# Patient Record
Sex: FEMALE | NOT HISPANIC OR LATINO | URBAN - METROPOLITAN AREA
[De-identification: names, ages, dates, MRNs, and addresses within clinical notes are randomized per-mention and may not be internally consistent; named-entity substitution may affect disease eponyms.]

---

## 2017-10-01 ENCOUNTER — EMERGENCY (EMERGENCY)
Facility: HOSPITAL | Age: 67
LOS: 1 days | Discharge: LEFT BEFORE DISCHARGE | End: 2017-10-01
Attending: EMERGENCY MEDICINE | Admitting: EMERGENCY MEDICINE
Payer: SELF-PAY

## 2017-10-01 VITALS
DIASTOLIC BLOOD PRESSURE: 71 MMHG | RESPIRATION RATE: 18 BRPM | SYSTOLIC BLOOD PRESSURE: 114 MMHG | OXYGEN SATURATION: 100 % | TEMPERATURE: 97 F | HEART RATE: 84 BPM

## 2017-10-01 DIAGNOSIS — R55 SYNCOPE AND COLLAPSE: ICD-10-CM

## 2017-10-01 LAB
ALBUMIN SERPL ELPH-MCNC: 4.7 G/DL — SIGNIFICANT CHANGE UP (ref 3.3–5)
ALP SERPL-CCNC: 81 U/L — SIGNIFICANT CHANGE UP (ref 40–120)
ALT FLD-CCNC: 16 U/L — SIGNIFICANT CHANGE UP (ref 10–45)
ANION GAP SERPL CALC-SCNC: 16 MMOL/L — SIGNIFICANT CHANGE UP (ref 5–17)
APTT BLD: 29.7 SEC — SIGNIFICANT CHANGE UP (ref 27.5–37.4)
AST SERPL-CCNC: 23 U/L — SIGNIFICANT CHANGE UP (ref 10–40)
BASOPHILS NFR BLD AUTO: 0.5 % — SIGNIFICANT CHANGE UP (ref 0–2)
BILIRUB SERPL-MCNC: 0.2 MG/DL — SIGNIFICANT CHANGE UP (ref 0.2–1.2)
BUN SERPL-MCNC: 16 MG/DL — SIGNIFICANT CHANGE UP (ref 7–23)
CALCIUM SERPL-MCNC: 9.4 MG/DL — SIGNIFICANT CHANGE UP (ref 8.4–10.5)
CHLORIDE SERPL-SCNC: 103 MMOL/L — SIGNIFICANT CHANGE UP (ref 96–108)
CK MB CFR SERPL CALC: 1.9 NG/ML — SIGNIFICANT CHANGE UP (ref 0–6.7)
CO2 SERPL-SCNC: 24 MMOL/L — SIGNIFICANT CHANGE UP (ref 22–31)
CREAT SERPL-MCNC: 0.7 MG/DL — SIGNIFICANT CHANGE UP (ref 0.5–1.3)
EOSINOPHIL NFR BLD AUTO: 3.4 % — SIGNIFICANT CHANGE UP (ref 0–6)
ETHANOL SERPL-MCNC: 244 MG/DL — HIGH (ref 0–10)
GLUCOSE SERPL-MCNC: 114 MG/DL — HIGH (ref 70–99)
HCT VFR BLD CALC: 39.6 % — SIGNIFICANT CHANGE UP (ref 34.5–45)
HGB BLD-MCNC: 13.4 G/DL — SIGNIFICANT CHANGE UP (ref 11.5–15.5)
INR BLD: 0.92 — SIGNIFICANT CHANGE UP (ref 0.88–1.16)
LACTATE SERPL-SCNC: 2 MMOL/L — SIGNIFICANT CHANGE UP (ref 0.5–2)
LYMPHOCYTES # BLD AUTO: 43.9 % — SIGNIFICANT CHANGE UP (ref 13–44)
MCHC RBC-ENTMCNC: 30.4 PG — SIGNIFICANT CHANGE UP (ref 27–34)
MCHC RBC-ENTMCNC: 33.8 G/DL — SIGNIFICANT CHANGE UP (ref 32–36)
MCV RBC AUTO: 89.8 FL — SIGNIFICANT CHANGE UP (ref 80–100)
MONOCYTES NFR BLD AUTO: 10 % — SIGNIFICANT CHANGE UP (ref 2–14)
NEUTROPHILS NFR BLD AUTO: 42.2 % — LOW (ref 43–77)
PLATELET # BLD AUTO: 259 K/UL — SIGNIFICANT CHANGE UP (ref 150–400)
POTASSIUM SERPL-MCNC: 3.8 MMOL/L — SIGNIFICANT CHANGE UP (ref 3.5–5.3)
POTASSIUM SERPL-SCNC: 3.8 MMOL/L — SIGNIFICANT CHANGE UP (ref 3.5–5.3)
PROT SERPL-MCNC: 7.9 G/DL — SIGNIFICANT CHANGE UP (ref 6–8.3)
PROTHROM AB SERPL-ACNC: 10.2 SEC — SIGNIFICANT CHANGE UP (ref 9.8–12.7)
RBC # BLD: 4.41 M/UL — SIGNIFICANT CHANGE UP (ref 3.8–5.2)
RBC # FLD: 12.9 % — SIGNIFICANT CHANGE UP (ref 10.3–16.9)
SODIUM SERPL-SCNC: 143 MMOL/L — SIGNIFICANT CHANGE UP (ref 135–145)
TROPONIN T SERPL-MCNC: <0.01 NG/ML — SIGNIFICANT CHANGE UP (ref 0–0.01)
WBC # BLD: 9.1 K/UL — SIGNIFICANT CHANGE UP (ref 3.8–10.5)
WBC # FLD AUTO: 9.1 K/UL — SIGNIFICANT CHANGE UP (ref 3.8–10.5)

## 2017-10-01 PROCEDURE — 99283 EMERGENCY DEPT VISIT LOW MDM: CPT | Mod: 25

## 2017-10-01 PROCEDURE — 84484 ASSAY OF TROPONIN QUANT: CPT

## 2017-10-01 PROCEDURE — 82553 CREATINE MB FRACTION: CPT

## 2017-10-01 PROCEDURE — 85610 PROTHROMBIN TIME: CPT

## 2017-10-01 PROCEDURE — 93005 ELECTROCARDIOGRAM TRACING: CPT

## 2017-10-01 PROCEDURE — 85730 THROMBOPLASTIN TIME PARTIAL: CPT

## 2017-10-01 PROCEDURE — 80307 DRUG TEST PRSMV CHEM ANLYZR: CPT

## 2017-10-01 PROCEDURE — 83605 ASSAY OF LACTIC ACID: CPT

## 2017-10-01 PROCEDURE — 82550 ASSAY OF CK (CPK): CPT

## 2017-10-01 PROCEDURE — 85025 COMPLETE CBC W/AUTO DIFF WBC: CPT

## 2017-10-01 PROCEDURE — 80053 COMPREHEN METABOLIC PANEL: CPT

## 2017-10-01 NOTE — ED ADULT NURSE NOTE - OBJECTIVE STATEMENT
pt received in bed 20, AOx3, per pt she had " [place], I don't remember passing out" per triage nurse, [place], called EMS. pt arrived alone, was told her family in their way to ER. pt denied any PMH, no home medication, denied chest pain, no SOB, no injury to head and body noted, pupils reactive , equal. [id number] on breath. pt received in bed 20, AOx3, clear speech, per pt she had " [place], I don't remember passing out" per triage nurse, [place] her family, called EMS. pt arrived alone, was told her family in their way to ER. pt denied any PMH, no home medication, denied chest pain, no SOB, no injury to head and body noted, pupils reactive , equal. denies any weakness, numbness, tingling to UE and LE, alcohol on breath. pt changed to hospital gown, placed on cardiac monitor, EKG done by Carie, presented to [facility]. IV line to left AV placed by EMS

## 2017-10-01 NOTE — ED ADULT NURSE REASSESSMENT NOTE - NS ED NURSE REASSESS COMMENT FT1
21:20 pt observed walking with a steady gait to restroom. pt changed her clothes in restroom and walked out. at 21:25 Carie charge nurse contacted pt 's home number  no answer. called security Dave, [address] contacted by security,   [place] [id number] arrive to ER , aware of pt 's condition and pt left with IV line.

## 2017-10-01 NOTE — ED ADULT TRIAGE NOTE - CHIEF COMPLAINT QUOTE
[place] the [place]  with family tonight with urine incontinence noted .Pt denies pain/discomfort. People witnessed looks like pt had seizure. No obvious head trauma/ injury.

## 2017-10-01 NOTE — ED ADULT NURSE NOTE - CHPI ED SYMPTOMS NEG
no vomiting/no numbness/no weakness/no change in level of consciousness/no confusion/no dizziness/no blurred vision/no fever/no nausea

## 2017-11-10 PROBLEM — Z00.00 ENCOUNTER FOR PREVENTIVE HEALTH EXAMINATION: Status: ACTIVE | Noted: 2017-11-10

## 2017-11-16 ENCOUNTER — RESULT CHARGE (OUTPATIENT)
Age: 67
End: 2017-11-16

## 2017-11-17 ENCOUNTER — APPOINTMENT (OUTPATIENT)
Dept: HEART AND VASCULAR | Facility: CLINIC | Age: 67
End: 2017-11-17
Payer: COMMERCIAL

## 2017-11-17 VITALS
DIASTOLIC BLOOD PRESSURE: 74 MMHG | HEART RATE: 89 BPM | WEIGHT: 162 LBS | BODY MASS INDEX: 30.98 KG/M2 | SYSTOLIC BLOOD PRESSURE: 130 MMHG | HEIGHT: 60.63 IN | OXYGEN SATURATION: 97 % | TEMPERATURE: 97.7 F

## 2017-11-17 DIAGNOSIS — Z82.49 FAMILY HISTORY OF ISCHEMIC HEART DISEASE AND OTHER DISEASES OF THE CIRCULATORY SYSTEM: ICD-10-CM

## 2017-11-17 DIAGNOSIS — Z87.39 PERSONAL HISTORY OF OTHER DISEASES OF THE MUSCULOSKELETAL SYSTEM AND CONNECTIVE TISSUE: ICD-10-CM

## 2017-11-17 DIAGNOSIS — F15.90 OTHER STIMULANT USE, UNSPECIFIED, UNCOMPLICATED: ICD-10-CM

## 2017-11-17 DIAGNOSIS — Z82.3 FAMILY HISTORY OF STROKE: ICD-10-CM

## 2017-11-17 DIAGNOSIS — Z90.49 ACQUIRED ABSENCE OF OTHER SPECIFIED PARTS OF DIGESTIVE TRACT: ICD-10-CM

## 2017-11-17 PROCEDURE — 99244 OFF/OP CNSLTJ NEW/EST MOD 40: CPT | Mod: 25

## 2017-11-17 PROCEDURE — 93000 ELECTROCARDIOGRAM COMPLETE: CPT

## 2017-11-17 PROCEDURE — 36415 COLL VENOUS BLD VENIPUNCTURE: CPT

## 2017-11-20 LAB
ALBUMIN SERPL ELPH-MCNC: 4.2 G/DL
ALP BLD-CCNC: 91 U/L
ALT SERPL-CCNC: 15 U/L
ANION GAP SERPL CALC-SCNC: 16 MMOL/L
AST SERPL-CCNC: 17 U/L
BASOPHILS # BLD AUTO: 0.03 K/UL
BASOPHILS NFR BLD AUTO: 0.7 %
BILIRUB SERPL-MCNC: 0.2 MG/DL
BUN SERPL-MCNC: 18 MG/DL
CALCIUM SERPL-MCNC: 9.7 MG/DL
CHLORIDE SERPL-SCNC: 103 MMOL/L
CHOLEST SERPL-MCNC: 216 MG/DL
CHOLEST/HDLC SERPL: 2.7 RATIO
CO2 SERPL-SCNC: 22 MMOL/L
CREAT SERPL-MCNC: 0.85 MG/DL
EOSINOPHIL # BLD AUTO: 0.17 K/UL
EOSINOPHIL NFR BLD AUTO: 3.8 %
GLUCOSE SERPL-MCNC: 126 MG/DL
HBA1C MFR BLD HPLC: 5.4 %
HCT VFR BLD CALC: 42.6 %
HDLC SERPL-MCNC: 80 MG/DL
HGB BLD-MCNC: 13.7 G/DL
IMM GRANULOCYTES NFR BLD AUTO: 0 %
LDLC SERPL CALC-MCNC: 99 MG/DL
LYMPHOCYTES # BLD AUTO: 1.82 K/UL
LYMPHOCYTES NFR BLD AUTO: 40.8 %
MAN DIFF?: NORMAL
MCHC RBC-ENTMCNC: 30.5 PG
MCHC RBC-ENTMCNC: 32.2 GM/DL
MCV RBC AUTO: 94.9 FL
MONOCYTES # BLD AUTO: 0.55 K/UL
MONOCYTES NFR BLD AUTO: 12.3 %
NEUTROPHILS # BLD AUTO: 1.89 K/UL
NEUTROPHILS NFR BLD AUTO: 42.4 %
PLATELET # BLD AUTO: 268 K/UL
POTASSIUM SERPL-SCNC: 4.3 MMOL/L
PROT SERPL-MCNC: 7.4 G/DL
RBC # BLD: 4.49 M/UL
RBC # FLD: 13.8 %
SODIUM SERPL-SCNC: 141 MMOL/L
TRIGL SERPL-MCNC: 187 MG/DL
WBC # FLD AUTO: 4.46 K/UL

## 2017-12-01 ENCOUNTER — APPOINTMENT (OUTPATIENT)
Dept: HEART AND VASCULAR | Facility: CLINIC | Age: 67
End: 2017-12-01
Payer: COMMERCIAL

## 2017-12-01 PROCEDURE — ZZZZZ: CPT

## 2017-12-01 PROCEDURE — 93306 TTE W/DOPPLER COMPLETE: CPT

## 2018-06-15 ENCOUNTER — APPOINTMENT (OUTPATIENT)
Dept: HEART AND VASCULAR | Facility: CLINIC | Age: 68
End: 2018-06-15
Payer: COMMERCIAL

## 2018-06-15 VITALS
OXYGEN SATURATION: 98 % | HEART RATE: 81 BPM | DIASTOLIC BLOOD PRESSURE: 80 MMHG | SYSTOLIC BLOOD PRESSURE: 130 MMHG | TEMPERATURE: 98.7 F | BODY MASS INDEX: 31.37 KG/M2 | WEIGHT: 164 LBS | HEIGHT: 60.63 IN

## 2018-06-15 DIAGNOSIS — R07.9 CHEST PAIN, UNSPECIFIED: ICD-10-CM

## 2018-06-15 DIAGNOSIS — R06.02 SHORTNESS OF BREATH: ICD-10-CM

## 2018-06-15 PROCEDURE — 99213 OFFICE O/P EST LOW 20 MIN: CPT

## 2018-08-20 ENCOUNTER — APPOINTMENT (OUTPATIENT)
Dept: HEART AND VASCULAR | Facility: CLINIC | Age: 68
End: 2018-08-20

## 2024-05-10 ENCOUNTER — APPOINTMENT (OUTPATIENT)
Dept: OTOLARYNGOLOGY | Facility: CLINIC | Age: 74
End: 2024-05-10
Payer: MEDICARE

## 2024-05-10 VITALS
HEART RATE: 72 BPM | BODY MASS INDEX: 32.2 KG/M2 | HEIGHT: 60 IN | WEIGHT: 164 LBS | OXYGEN SATURATION: 95 % | SYSTOLIC BLOOD PRESSURE: 154 MMHG | DIASTOLIC BLOOD PRESSURE: 79 MMHG

## 2024-05-10 DIAGNOSIS — K21.9 ACUTE LARYNGITIS: ICD-10-CM

## 2024-05-10 DIAGNOSIS — R05.9 COUGH, UNSPECIFIED: ICD-10-CM

## 2024-05-10 DIAGNOSIS — K21.9 GASTRO-ESOPHAGEAL REFLUX DISEASE W/OUT ESOPHAGITIS: ICD-10-CM

## 2024-05-10 DIAGNOSIS — J04.0 ACUTE LARYNGITIS: ICD-10-CM

## 2024-05-10 DIAGNOSIS — R09.82 POSTNASAL DRIP: ICD-10-CM

## 2024-05-10 PROCEDURE — 31579 LARYNGOSCOPY TELESCOPIC: CPT

## 2024-05-10 PROCEDURE — 31231 NASAL ENDOSCOPY DX: CPT | Mod: 59

## 2024-05-10 PROCEDURE — 99204 OFFICE O/P NEW MOD 45 MIN: CPT | Mod: 25

## 2024-05-10 RX ORDER — OMEPRAZOLE 40 MG/1
40 CAPSULE, DELAYED RELEASE ORAL
Qty: 30 | Refills: 3 | Status: ACTIVE | COMMUNITY
Start: 2024-05-10 | End: 1900-01-01

## 2024-05-10 RX ORDER — FAMOTIDINE 20 MG/1
20 TABLET, FILM COATED ORAL
Qty: 30 | Refills: 3 | Status: ACTIVE | COMMUNITY
Start: 2024-05-10 | End: 1900-01-01

## 2024-05-10 RX ORDER — FLUTICASONE PROPIONATE 50 UG/1
50 SPRAY, METERED NASAL DAILY
Qty: 3 | Refills: 1 | Status: ACTIVE | COMMUNITY
Start: 2024-05-10 | End: 1900-01-01

## 2024-05-10 NOTE — ASSESSMENT
[FreeTextEntry1] : - 5/10/24: 74 y/o F presents with postnasal drip and cough for the past year. She brings up phlegm, it is everyday, and sporadic throughout the day. Based on history and physical exam, I do believe there is a component of laryngopharyngeal reflux. At this time I am recommending dietary and behavioral change to reduce acid in the diet. I am also recommending omeprazole as well famotidine for a 3 months trial. Follow up in 3 months    She endorses postnasal drip and nasal congestion. On physical exam she was found to have left septal deviation, bilateral turbinate hypertrophy, and inflammation consistent with allergies. I am recommending nasal saline rinses and nasal steroids.  -Dietary and behavioral modification to reduce acid reflux, handout given -Omeprazole qd as well as Famotidine qhs -Voice hygiene, increase hydration, sips of water throughout the day, avoid throat clearing -Nasal saline rinses and nasal steroids  -Followup in 3 months.

## 2024-05-10 NOTE — PROCEDURE
[FreeTextEntry6] : -  Pre-operative Diagnosis: Postnasal drip Post-operative Diagnosis: Bilateral turbinate hypertrophy, left septal deviation, inflammation c/w allergies Anesthesia: Topical Procedure: Bilateral nasal endoscopy Procedure Details:   After topical anesthesia and decongestant, the patient was placed in the supine position. The telescope was passed along the left nasal floor to the nasopharynx. It was then passed into the region of the middle meatus, middle turbinate, and the sphenoethmoid region. An identical procedure was performed on the right side.   Findings: Mucosa:   inflammation c/w allergies  Nasal septum: left septal deviation Discharge:  none Turbinates:  bilateral turbinate hypertrophy  Adenoid:   normal Posterior choanae:  normal Eustachian tubes:  normal  Mucous stranding:  normal   Lesions:   Not present   Comments:   Condition: Stable. Patient tolerated procedure well. [de-identified] : -   Pre-operative Diagnosis: Dysphonia, Throat discomfort Post-operative Diagnosis: laryngopharyngeal reflux Anesthesia: Topical - 1 % Lidocaine/Phenylephrine Procedure: Flexible Laryngoscopy with Stroboscopy - [facility] [id number]   Procedure Details: The patient was placed in the sitting position. After decongestant and anesthesia were applied the laryngoscope was passed. The nasal cavities, nasopharynx, oropharynx, hypopharynx, and larynx were all examined. Vocal folds were examined during respiration and phonation. The following findings were noted:   Findings: Nose: Septum is midline, turbinates are normal, nasal airways patent, mucosa normal Nasopharynx: Adenoids normal, no masses, eustachian tube normal Oropharynx: Pharyngeal walls symmetric and without lesion. Tonsils/fossae symmetric Hypopharynx: Hypopharynx and pyriform sinuses without lesion. No masses or asymmetry. Larynx: Epiglottis and aryepiglottic folds were sharp and crisp bilaterally. Bilateral false vocal folds normal appearance. Airway was widely patent. + postcricoid edema, erythema of the vocal process   Strobe Exam Ratings   TVF Appearance: normal, mild erythema of the vocal process TVF Mobility: normal Edema/hypertrophy: normal, + postcricoid edema Mucus on TVF: normal Glottic Closure: normal/adequate Mucosal Wave: normal Amplitude of Vibration: normal Phase: symmetric Supraglottic Hyperfunction: none Other Findings: none   Condition: Stable. Patient tolerated procedure well.   Complications: None

## 2024-05-10 NOTE — HISTORY OF PRESENT ILLNESS
[de-identified] : 5/10/24: 72 y/o F presents with postnasal drip and cough for the past year. She brings up phlegm, it is everyday, and sporadic throughout the day. No issues eating, chewing, or swallowing. No issues breathing, or regurgitation of food. She has seen a pulmonologist and tested negative for asthma. She has history of reflux, seen on endoscopy by GI. She is not currently on any medications. No voice issues. Retired, no significant voice demands.   Diet: +coffee, tea, soda, mint, tomato, citrus, garlic, onion, blueberry, carbonated beverages - chocolate, spicy food water intake: 5 glasses  late night eating: no  She endorses postnasal drip and nasal congestion. She denies facial pain, rhinorrhea, or changes in smell or taste. She has not tried anything in terms of treatment.